# Patient Record
Sex: MALE | Race: WHITE | NOT HISPANIC OR LATINO
[De-identification: names, ages, dates, MRNs, and addresses within clinical notes are randomized per-mention and may not be internally consistent; named-entity substitution may affect disease eponyms.]

---

## 2018-10-01 ENCOUNTER — APPOINTMENT (OUTPATIENT)
Dept: ULTRASOUND IMAGING | Facility: CLINIC | Age: 78
End: 2018-10-01
Payer: MEDICARE

## 2018-10-01 ENCOUNTER — OUTPATIENT (OUTPATIENT)
Dept: OUTPATIENT SERVICES | Facility: HOSPITAL | Age: 78
LOS: 1 days | End: 2018-10-01
Payer: MEDICARE

## 2018-10-01 DIAGNOSIS — Z00.8 ENCOUNTER FOR OTHER GENERAL EXAMINATION: ICD-10-CM

## 2018-10-01 PROCEDURE — 76536 US EXAM OF HEAD AND NECK: CPT | Mod: 26

## 2018-10-01 PROCEDURE — 76536 US EXAM OF HEAD AND NECK: CPT

## 2020-08-14 ENCOUNTER — APPOINTMENT (OUTPATIENT)
Dept: NEUROLOGY | Facility: CLINIC | Age: 80
End: 2020-08-14
Payer: MEDICARE

## 2020-08-14 VITALS
BODY MASS INDEX: 20.04 KG/M2 | HEART RATE: 59 BPM | HEIGHT: 70 IN | DIASTOLIC BLOOD PRESSURE: 66 MMHG | SYSTOLIC BLOOD PRESSURE: 119 MMHG | WEIGHT: 140 LBS

## 2020-08-14 VITALS — TEMPERATURE: 97.3 F

## 2020-08-14 DIAGNOSIS — Z78.9 OTHER SPECIFIED HEALTH STATUS: ICD-10-CM

## 2020-08-14 DIAGNOSIS — I10 ESSENTIAL (PRIMARY) HYPERTENSION: ICD-10-CM

## 2020-08-14 DIAGNOSIS — G31.84 MILD COGNITIVE IMPAIRMENT, SO STATED: ICD-10-CM

## 2020-08-14 DIAGNOSIS — Z87.891 PERSONAL HISTORY OF NICOTINE DEPENDENCE: ICD-10-CM

## 2020-08-14 DIAGNOSIS — E78.00 PURE HYPERCHOLESTEROLEMIA, UNSPECIFIED: ICD-10-CM

## 2020-08-14 PROCEDURE — 99205 OFFICE O/P NEW HI 60 MIN: CPT

## 2020-08-14 RX ORDER — RIVASTIGMINE TARTRATE 6 MG/1
6 CAPSULE ORAL TWICE DAILY
Refills: 0 | Status: ACTIVE | COMMUNITY

## 2020-08-14 RX ORDER — UBIDECARENONE/VIT E ACET 100MG-5
CAPSULE ORAL TWICE DAILY
Refills: 0 | Status: ACTIVE | COMMUNITY

## 2020-08-14 RX ORDER — CARBIDOPA AND LEVODOPA 25; 100 MG/1; MG/1
25-100 TABLET ORAL
Refills: 0 | Status: ACTIVE | COMMUNITY
Start: 2020-08-14

## 2020-08-14 RX ORDER — PIMAVANSERIN TARTRATE 34 MG/1
34 CAPSULE ORAL
Refills: 0 | Status: ACTIVE | COMMUNITY

## 2020-08-14 RX ORDER — CARBIDOPA AND LEVODOPA 25; 100 MG/1; MG/1
25-100 TABLET, EXTENDED RELEASE ORAL
Refills: 0 | Status: DISCONTINUED | COMMUNITY
End: 2020-08-14

## 2020-08-14 NOTE — HISTORY OF PRESENT ILLNESS
[FreeTextEntry1] : The patient is an 80-year-old right-handed man with a diagnosis of Parkinson's disease, who is followed by specialists both in [place] and in [place]. In [place], he used to see Dr. South Koo, who has left the group and he is now coming here for transfer of care. In [place] he is seeing Dr. David Dejesus. \par He was diagnosed in 2010, when he developed right-sided symptoms. The medications to work, and is currently taking carbidopa/levodopa 25/100, 1.5 pills at 8 AM, and one pill at 12 PM, 4 PM, and 8 PM. He feels that working for a couple hours, but notices wearing off at the end of that 4 hours he does not have any dyskinesias.\par His face and voice are mildly affected. He has significant drooling, which has been treated with botulinum toxin, his most recent injection being at the end of May. He has no dysphagia. He has some trouble turning over. He is independent in his activities of daily living, and his handwriting is small. When he feels on, his walking is intact, and he has not fallen. He does have some freezing of gait, especially over night and early in the morning, until his first levodopa dose.\par He has no memory complaints, and no depression or anxiety. In February of 2020 he developed hallucinations, seeing little people. He was started on Nuplazid, which resolved the hallucinations after about 6-8 weeks. He has no constipation or orthostasis. He sleeps poorly but has no history of acting out his dreams. No family history of Parkinson's disease or other movement disorder.

## 2020-08-14 NOTE — PHYSICAL EXAM
[Place] : oriented to place [Person] : oriented to person [Time] : oriented to time [Registration Intact] : recent registration memory intact [Naming Objects] : no difficulty naming common objects [Repeating Phrases] : no difficulty repeating a phrase [Fluency] : fluency intact [Cranial Nerves Optic (II)] : visual acuity intact bilaterally,  visual fields full to confrontation, pupils equal round and reactive to light [Comprehension] : comprehension intact [Cranial Nerves Oculomotor (III)] : extraocular motion intact [Cranial Nerves Trigeminal (V)] : facial sensation intact symmetrically [Cranial Nerves Facial (VII)] : face symmetrical [Cranial Nerves Vestibulocochlear (VIII)] : hearing was intact bilaterally [Cranial Nerves Hypoglossal (XII)] : there was no tongue deviation with protrusion [Cranial Nerves Glossopharyngeal (IX)] : tongue and palate midline [Cranial Nerves Accessory (XI - Cranial And Spinal)] : head turning and shoulder shrug symmetric [Motor Strength] : muscle strength was normal in all four extremities [Motor Handedness Right-Handed] : the patient is right hand dominant [Sensation Pain / Temperature Decrease] : pain and temperature was intact [Sensation Vibration Decrease] : vibration was intact [1+] : Ankle jerk left 1+ [Heart Rate And Rhythm] : heart rate was normal and rhythm regular [Abdomen Soft] : soft [Abdomen Tenderness] : non-tender [No CVA Tenderness] : no ~M costovertebral angle tenderness [Skin Lesions] : no skin lesions [] : no rash [Musculoskeletal - Swelling] : no joint swelling seen [Short Term Intact] : short term memory impaired [Plantar Reflex Right Only] : normal on the right [FreeTextEntry1] : He had a decreased blink rate, and narrowing of the right eye. Voice was relatively normal. Extraocular movements were intact with normal saccades, smooth pursuit, no square wave jerks seen. Tone was moderately decreased at the neck as well as the right more than the left upper and lower extremities. Rapid alternating movements were decreased in amplitude and speed of the right upper extremity. Foot tapping was impaired bilaterally, worse on the right. He easily got up from the chair without using his arms. Gait was normal based and steady with good heel toe stride but multistep turn. He had a parkinsonian rest tremor of the right arm, which was reemergent during walking. Pull test was negative. [Plantar Reflex Left Only] : normal on the left [FreeTextEntry4] : 2/3 delayed recall

## 2020-08-14 NOTE — DISCUSSION/SUMMARY
[FreeTextEntry1] : In summary, the patient is an 80-year-old right-handed man with a roughly ten-year history of Parkinson's disease, which began on the right side. Examination is significant for right greater than rigidity and slowing with a right-handed parkinsonian rest tremor. Overall, I agree that the history and examination is indeed consistent with idiopathic Parkinson's disease. Also consistent with this, he reports a good response to levodopa, with each dose lasting 2-4 hours, with some wearing off. He has no dyskinesias. He does seem to struggle overnight one needs to get up to urinate.\par \par During the day he seems to do well with his current regimen, but I did recommend the addition of Sinemet CR at bedtime. At this point he and his wife are hesitant to add anything given the history of hallucinations, but they will think about it. Other options would be to add an MAO-B inhibitor or a dopamine agonist such as rotigotine, though this might increase the hallucinations again.\par \par For his poor sleep, I did advise him to increase his melatonin from 5 mg to 10 mg. He also is interested in getting injections with botulinum toxin while in [place]. This would be due in about 2 weeks, and I've asked him to get me the records to see which type of botulinum toxin was used. I will then schedule him to get the injections at care. He does take rivastigmine for mild cognitive impairment, which I advised him to continue.\par \par I spent approximately 1 hour with the patient and his wife, examining and discussing the above findings and impression. Followup will be in 2-3 weeks for the injections.

## 2020-09-02 ENCOUNTER — APPOINTMENT (OUTPATIENT)
Dept: NEUROLOGY | Facility: CLINIC | Age: 80
End: 2020-09-02
Payer: MEDICARE

## 2020-09-02 VITALS — TEMPERATURE: 97.3 F

## 2020-09-02 DIAGNOSIS — K11.7 DISTURBANCES OF SALIVARY SECRETION: ICD-10-CM

## 2020-09-02 PROCEDURE — 99213 OFFICE O/P EST LOW 20 MIN: CPT | Mod: 25

## 2020-09-02 PROCEDURE — 64611 CHEMODENERV SALIV GLANDS: CPT

## 2020-09-02 NOTE — HISTORY OF PRESENT ILLNESS
[FreeTextEntry1] : The patient is an 80-year-old right-handed man with a diagnosis of Parkinson's disease, who is followed by specialists both in [place] and in [place]. In [place], he used to see Dr. South Koo, who has left the group and he is now coming here for transfer of care. In [place] he is seeing Dr. David Dejesus. \par He was diagnosed in 2010, when he developed right-sided symptoms. The medications to work, and is currently taking carbidopa/levodopa 25/100, 1.5 pills at 8 AM, and one pill at 12 PM, 4 PM, and 8 PM. He feels that working for a couple hours, but notices wearing off at the end of that 4 hours he does not have any dyskinesias.\par His face and voice are mildly affected. He has significant drooling, which has been treated with botulinum toxin, his most recent injection being at the end of May. He has no dysphagia. He has some trouble turning over. He is independent in his activities of daily living, and his handwriting is small. When he feels on, his walking is intact, and he has not fallen. He does have some freezing of gait, especially over night and early in the morning, until his first levodopa dose.\par He has no memory complaints, and no depression or anxiety. In February of 2020 he developed hallucinations, seeing little people. He was started on Nuplazid, which resolved the hallucinations after about 6-8 weeks. He has no constipation or orthostasis. He sleeps poorly but has no history of acting out his dreams. No family history of Parkinson's disease or other movement disorder\par \par 1. Here for injections today. Per his wife, last time he got 3000 units myobloc

## 2020-09-02 NOTE — DISCUSSION/SUMMARY
[FreeTextEntry1] : In summary, the patient is an 80-year-old right-handed man with a roughly ten-year history of Parkinson's disease, which began on the right side. Examination is significant for right greater than rigidity and slowing with a right-handed parkinsonian rest tremor. Overall, I agree that the history and examination is indeed consistent with idiopathic Parkinson's disease. Also consistent with this, he reports a good response to levodopa, with each dose lasting 2-4 hours, with some wearing off. He has no dyskinesias. He does seem to struggle overnight one needs to get up to urinate.\par \par During the day he seems to do well with his current regimen, but I did recommend the addition of Sinemet CR at bedtime. At this point he and his wife are hesitant to add anything given the history of hallucinations, but they will think about it. Other options would be to add an MAO-B inhibitor or a dopamine agonist such as rotigotine, though this might increase the hallucinations again.\par \par For his poor sleep, I did advise him to increase his melatonin from 5 mg to 10 mg. He also is interested in getting injections with botulinum toxin while in [place]. This would be due in about 2 weeks, and I've asked him to get me the records to see which type of botulinum toxin was used. I will then schedule him to get the injections at care. He does take rivastigmine for mild cognitive impairment, which I advised him to continue.\par \par 1. Injected today per procedure note\par 2. Continue PD meds\par \par We discussed the above impression, plan and recommendations during the visit. Counseling represented more then 50% of the 15 minute visit time\par

## 2020-09-02 NOTE — PROCEDURE
[FreeTextEntry1] : The patient received injections with botulinum toxin B (myobloc), diluted at 500 units/0.1 cc via a 30 ga needle into the following sites, in the usual antiseptic manner. Possible side effects including over-weakening, dysphagia, bleeding, and local infection were discussed.\par \par 1. Left parotid gland 1250/2\par 2. Right parotid gland 1250/2\par \par Total injected 2500 units, waste 0 units, total used 2500 units \par The patient tolerated the procedure well, with not complications\par

## 2020-09-02 NOTE — PHYSICAL EXAM
[Person] : oriented to person [Place] : oriented to place [Time] : oriented to time [Registration Intact] : recent registration memory intact [Naming Objects] : no difficulty naming common objects [Repeating Phrases] : no difficulty repeating a phrase [Fluency] : fluency intact [Comprehension] : comprehension intact [Cranial Nerves Optic (II)] : visual acuity intact bilaterally,  visual fields full to confrontation, pupils equal round and reactive to light [Cranial Nerves Trigeminal (V)] : facial sensation intact symmetrically [Cranial Nerves Oculomotor (III)] : extraocular motion intact [Cranial Nerves Facial (VII)] : face symmetrical [Cranial Nerves Vestibulocochlear (VIII)] : hearing was intact bilaterally [Cranial Nerves Glossopharyngeal (IX)] : tongue and palate midline [Cranial Nerves Accessory (XI - Cranial And Spinal)] : head turning and shoulder shrug symmetric [Cranial Nerves Hypoglossal (XII)] : there was no tongue deviation with protrusion [Motor Strength] : muscle strength was normal in all four extremities [Motor Handedness Right-Handed] : the patient is right hand dominant [Sensation Pain / Temperature Decrease] : pain and temperature was intact [Sensation Vibration Decrease] : vibration was intact [1+] : Ankle jerk left 1+ [Plantar Reflex Right Only] : normal on the right [Short Term Intact] : short term memory impaired [Plantar Reflex Left Only] : normal on the left [FreeTextEntry4] : 2/3 delayed recall [FreeTextEntry1] : He had a decreased blink rate, and narrowing of the right eye. Voice was relatively normal. Extraocular movements were intact with normal saccades, smooth pursuit, no square wave jerks seen. Tone was moderately decreased at the neck as well as the right more than the left upper and lower extremities. Rapid alternating movements were decreased in amplitude and speed of the right upper extremity. Foot tapping was impaired bilaterally, worse on the right. He easily got up from the chair without using his arms. Gait was normal based and steady with good heel toe stride but multistep turn. He had a parkinsonian rest tremor of the right arm, which was reemergent during walking. Pull test was negative.

## 2020-10-22 PROBLEM — K11.7 SIALORRHEA: Status: ACTIVE | Noted: 2020-08-14

## 2021-08-12 RX ORDER — APIXABAN 2.5 MG/1
2.5 TABLET, FILM COATED ORAL
Refills: 0 | Status: ACTIVE | COMMUNITY

## 2021-08-12 RX ORDER — ATORVASTATIN CALCIUM 20 MG/1
20 TABLET, FILM COATED ORAL DAILY
Refills: 0 | Status: DISCONTINUED | COMMUNITY
End: 2021-08-12

## 2021-08-12 RX ORDER — METOPROLOL TARTRATE 25 MG/1
25 TABLET, FILM COATED ORAL DAILY
Refills: 0 | Status: DISCONTINUED | COMMUNITY
End: 2021-08-12

## 2021-08-12 RX ORDER — ATORVASTATIN CALCIUM 10 MG/1
10 TABLET, FILM COATED ORAL
Refills: 0 | Status: ACTIVE | COMMUNITY

## 2021-08-12 RX ORDER — OLMESARTAN MEDOXOMIL 20 MG/1
20 TABLET, FILM COATED ORAL DAILY
Refills: 0 | Status: DISCONTINUED | COMMUNITY
End: 2021-08-12

## 2021-08-12 RX ORDER — RIVAROXABAN 15 MG/1
15 TABLET, FILM COATED ORAL
Refills: 0 | Status: DISCONTINUED | COMMUNITY
End: 2021-08-12

## 2021-08-13 ENCOUNTER — APPOINTMENT (OUTPATIENT)
Dept: CARDIOLOGY | Facility: CLINIC | Age: 81
End: 2021-08-13
Payer: MEDICARE

## 2021-08-13 VITALS — SYSTOLIC BLOOD PRESSURE: 120 MMHG | HEART RATE: 80 BPM | DIASTOLIC BLOOD PRESSURE: 80 MMHG | OXYGEN SATURATION: 96 %

## 2021-08-13 VITALS — HEIGHT: 70 IN | BODY MASS INDEX: 18.32 KG/M2 | WEIGHT: 128 LBS

## 2021-08-13 DIAGNOSIS — Z85.46 PERSONAL HISTORY OF MALIGNANT NEOPLASM OF PROSTATE: ICD-10-CM

## 2021-08-13 DIAGNOSIS — I48.20 CHRONIC ATRIAL FIBRILLATION, UNSP: ICD-10-CM

## 2021-08-13 DIAGNOSIS — I48.0 PAROXYSMAL ATRIAL FIBRILLATION: ICD-10-CM

## 2021-08-13 DIAGNOSIS — R63.4 ABNORMAL WEIGHT LOSS: ICD-10-CM

## 2021-08-13 DIAGNOSIS — G90.3 MULTI-SYSTEM DEGENERATION OF THE AUTONOMIC NERVOUS SYSTEM: ICD-10-CM

## 2021-08-13 DIAGNOSIS — G20 PARKINSON'S DISEASE: ICD-10-CM

## 2021-08-13 PROCEDURE — 93000 ELECTROCARDIOGRAM COMPLETE: CPT

## 2021-08-13 PROCEDURE — 99214 OFFICE O/P EST MOD 30 MIN: CPT

## 2021-08-13 RX ORDER — DROXIDOPA 300 MG/1
300 CAPSULE ORAL
Refills: 0 | Status: ACTIVE | COMMUNITY

## 2021-08-13 NOTE — HISTORY OF PRESENT ILLNESS
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 He has follow up rec for end of Sept.\par Wt loss.  CT scan was done per heme, had lung abnormality, went for PET scan, saw Dr Fleming for lung abnormality, and was eventually felt to be inflammatory not malignant.  Was prescribed short course of prednisone and had repeat CT scan and saw Dr Fleming yest in follow up.\par Has chronic cough felt to be due to aspiration, rx'd benzonatate.   Was advised to repeat CT in Spring.\par No chest pain, SOB.  BP ok/stable to 120 systolic.\par

## 2021-08-13 NOTE — DISCUSSION/SUMMARY
[FreeTextEntry1] : Advised fall precautions.\par Contin Eliquis current dose\par Contin other current meds\par Rx given for labs\par Request med records\par Urol and neuro follow up\par Follow up 3-4 mo

## 2021-08-13 NOTE — REVIEW OF SYSTEMS
[Cough] : cough [Nocturia] : nocturia [Tremor] : a tremor was seen [Limb Weakness (Paresis)] : limb weakness (Paresis) [Speech Disturbance] : speech disturbance [Negative] : Gastrointestinal

## 2021-10-06 PROBLEM — I10 ESSENTIAL HYPERTENSION: Status: ACTIVE | Noted: 2020-08-14
